# Patient Record
(demographics unavailable — no encounter records)

---

## 2020-11-23 NOTE — NUR
Patient discharged with v/s stable. Written and verbal after care instructions 
given and explained. 

Patient alert, oriented and verbalized understanding of instructions. 
Ambulatory with steady gait. All questions addressed prior to discharge. ID 
band removed. Patient advised to follow up with PMD. Rx of ACETAMINOPHEN, 
ATIVAN, IBUPROFEN given. Patient educated on indication of medication including 
possible reaction and side effects. Opportunity to ask questions provided and 
answered.

## 2020-11-23 NOTE — NUR
BIB SELF C/O FEVER, HA, BODY ACHE X 3 DAYS. PT HAD COVID TESTED +. TOOK TYLENOL 
AT 5 AM TODAY. ORAL TEMP 98.2 AT THIS TIME.MED HX: GALL BLADDER REMOVAL. DENIES 
N/V/D; SKIN IS PINK/WARM/DRY; AAOX4 WITH EVEN AND STEADY GAIT; LUNGS CLEAR BL; 
HR EVEN AND REGULAR; PT DENIES ANY CP, SOB OR COUGH AT THIS TIME; PATIENT 
STATES PAIN OF 9/10 AT THIS TIME.

## 2020-11-24 NOTE — NUR
-------------------------------------------------------------------------------

            *** Note undone in ED - 11/24/20 at 1633 by [facility] ***            

-------------------------------------------------------------------------------

Patient discharged with v/s stable. Written and verbal after care instructions 
given and explained. 

Patient verbalized understanding. Ambulatory with steady gait. All questions 
addressed prior to discharge. Advised to follow up with PMD.

## 2020-11-24 NOTE — NUR
Patient discharged with v/s stable. Written and verbal after care instructions 
given and explained. 

Patient alert, oriented and verbalized understanding of instructions. 
Ambulatory with steady gait. All questions addressed prior to discharge. ID 
band removed. Patient advised to follow up with PMD. Rx of diphenhydramine 25mg 
TID PRN given. Patient educated on indication of medication including possible 
reaction and side effects. Opportunity to ask questions provided and answered. Home

## 2020-11-24 NOTE — NUR
38 y/o male a&oX4 COVID 19 + 11/17/20. Insomnia. Pt states he needs 
reinforcement on current prescribed medications. 



PMH: Anxiety



Rx: Ativan, ibuprofen, acetaminophen



NKA